# Patient Record
Sex: FEMALE | Race: WHITE
[De-identification: names, ages, dates, MRNs, and addresses within clinical notes are randomized per-mention and may not be internally consistent; named-entity substitution may affect disease eponyms.]

---

## 2019-12-13 ENCOUNTER — HOSPITAL ENCOUNTER (EMERGENCY)
Dept: HOSPITAL 97 - ER | Age: 84
Discharge: HOME | End: 2019-12-13
Payer: COMMERCIAL

## 2019-12-13 VITALS — DIASTOLIC BLOOD PRESSURE: 78 MMHG | TEMPERATURE: 97.8 F | OXYGEN SATURATION: 99 % | SYSTOLIC BLOOD PRESSURE: 128 MMHG

## 2019-12-13 DIAGNOSIS — S92.332A: ICD-10-CM
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
## 2019-12-13 DIAGNOSIS — Z88.0: ICD-10-CM

## 2019-12-13 DIAGNOSIS — S92.322A: Primary | ICD-10-CM

## 2019-12-13 DIAGNOSIS — S92.342A: ICD-10-CM
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
## 2019-12-13 DIAGNOSIS — Z88.6: ICD-10-CM

## 2019-12-13 PROCEDURE — 99284 EMERGENCY DEPT VISIT MOD MDM: CPT

## 2019-12-13 PROCEDURE — 90471 IMMUNIZATION ADMIN: CPT

## 2019-12-13 PROCEDURE — 90714 TD VACC NO PRESV 7 YRS+ IM: CPT

## 2019-12-13 PROCEDURE — 2W3RX1Z IMMOBILIZATION OF LEFT LOWER LEG USING SPLINT: ICD-10-PCS
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
## 2019-12-13 NOTE — RAD REPORT
EXAM DESCRIPTION:  RAD - Foot Left 3 View - 12/13/2019 12:57 pm

 

CLINICAL HISTORY:  Left Foot pain

 

FINDINGS:  Mildly displaced fractures involve the bases of the second, third fourth metatarsals.

 

Small bony density lies adjacent to medial cuneiform bone which may represent a small avulsion fractu
re

 

No dislocation noted.

 

Bones are osteoporotic

## 2024-11-23 ENCOUNTER — HOSPITAL ENCOUNTER (EMERGENCY)
Dept: HOSPITAL 97 - ER | Age: 89
Discharge: HOME | End: 2024-11-23
Payer: COMMERCIAL

## 2024-11-23 DIAGNOSIS — I10: ICD-10-CM

## 2024-11-23 DIAGNOSIS — E78.5: ICD-10-CM

## 2024-11-23 DIAGNOSIS — R55: Primary | ICD-10-CM

## 2024-11-23 LAB
ALBUMIN SERPL BCP-MCNC: 3.7 G/DL (ref 3.4–5)
ALBUMIN/GLOB SERPL: 1.3 {RATIO} (ref 1.1–1.8)
ALP SERPL-CCNC: 73 U/L (ref 45–117)
ALT SERPL W P-5'-P-CCNC: 15 U/L (ref 13–56)
ANION GAP SERPL CALC-SCNC: 8.6 MEQ/L (ref 5–15)
AST SERPL W P-5'-P-CCNC: 15 U/L (ref 15–37)
BILIRUB INDIRECT SERPL-MCNC: 0.6 MG/DL (ref 0.2–0.8)
BUN BLD-MCNC: 23 MG/DL (ref 7–18)
GLOBULIN SER CALC-MCNC: 2.8 G/DL (ref 2.3–3.5)
GLUCOSE SERPLBLD-MCNC: 130 MG/DL (ref 74–106)
HCT VFR BLD CALC: 38.1 % (ref 36–45)
HGB BLD-MCNC: 13.1 G/DL (ref 12–15)
LYMPHOCYTES # SPEC AUTO: 1.5 K/UL (ref 0.7–4.9)
MCH RBC QN AUTO: 32.7 PG (ref 27–35)
MCHC RBC AUTO-ENTMCNC: 34.4 G/DL (ref 32–36)
MCV RBC: 95 FL (ref 80–100)
NRBC # BLD: 0 10*3/UL (ref 0–0)
NRBC BLD AUTO-RTO: 0 % (ref 0–0)
PMV BLD: 7.6 FL (ref 7.6–11.3)
POTASSIUM SERPL-SCNC: 3.6 MEQ/L (ref 3.5–5.1)
RBC # BLD: 4.01 M/UL (ref 3.86–4.86)
SQUAMOUS URNS QL MICRO: <5 /HPF
TROPONIN I SERPL HS-MCNC: 11.4 PG/ML (ref ?–58.9)
UA COMPLETE W REFLEX CULTURE PNL UR: (no result)
UA COMPLETE W REFLEX CULTURE PNL UR: (no result)
WBC # BLD AUTO: 9.2 THOU/UL (ref 4.3–10.9)

## 2024-11-23 PROCEDURE — 70450 CT HEAD/BRAIN W/O DYE: CPT

## 2024-11-23 PROCEDURE — 84484 ASSAY OF TROPONIN QUANT: CPT

## 2024-11-23 PROCEDURE — 93005 ELECTROCARDIOGRAM TRACING: CPT

## 2024-11-23 PROCEDURE — 85025 COMPLETE CBC W/AUTO DIFF WBC: CPT

## 2024-11-23 PROCEDURE — 80048 BASIC METABOLIC PNL TOTAL CA: CPT

## 2024-11-23 PROCEDURE — 80076 HEPATIC FUNCTION PANEL: CPT

## 2024-11-23 PROCEDURE — 81001 URINALYSIS AUTO W/SCOPE: CPT

## 2024-11-23 PROCEDURE — 71045 X-RAY EXAM CHEST 1 VIEW: CPT

## 2024-11-23 PROCEDURE — 36415 COLL VENOUS BLD VENIPUNCTURE: CPT

## 2024-11-24 VITALS — OXYGEN SATURATION: 98 % | SYSTOLIC BLOOD PRESSURE: 148 MMHG | DIASTOLIC BLOOD PRESSURE: 67 MMHG

## 2024-11-24 VITALS — TEMPERATURE: 97.6 F

## 2024-12-30 ENCOUNTER — HOSPITAL ENCOUNTER (EMERGENCY)
Dept: HOSPITAL 97 - ER | Age: 89
Discharge: HOME | End: 2024-12-30
Payer: COMMERCIAL

## 2024-12-30 DIAGNOSIS — S05.12XA: Primary | ICD-10-CM

## 2024-12-30 DIAGNOSIS — N39.0: ICD-10-CM

## 2024-12-30 DIAGNOSIS — M10.9: ICD-10-CM

## 2024-12-30 DIAGNOSIS — Z91.81: ICD-10-CM
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
## 2024-12-30 DIAGNOSIS — S05.11XA: ICD-10-CM

## 2024-12-30 DIAGNOSIS — S00.81XA: ICD-10-CM

## 2024-12-30 DIAGNOSIS — Z88.5: ICD-10-CM

## 2024-12-30 DIAGNOSIS — I10: ICD-10-CM
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
## 2024-12-30 DIAGNOSIS — F03.90: ICD-10-CM

## 2024-12-30 DIAGNOSIS — Z88.0: ICD-10-CM
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
## 2024-12-30 DIAGNOSIS — S22.080A: ICD-10-CM

## 2024-12-30 DIAGNOSIS — E78.5: ICD-10-CM

## 2024-12-30 LAB
ANION GAP SERPL CALC-SCNC: 8.4 MEQ/L (ref 5–15)
BUN BLD-MCNC: 31 MG/DL (ref 7–18)
GLUCOSE SERPLBLD-MCNC: 125 MG/DL (ref 74–106)
HCT VFR BLD CALC: 42.2 % (ref 36–45)
HGB BLD-MCNC: 13.8 G/DL (ref 12–15)
INR BLD: 0.98
LYMPHOCYTES # SPEC AUTO: 1.7 K/UL (ref 0.7–4.9)
MCH RBC QN AUTO: 31.5 PG (ref 27–35)
MCHC RBC AUTO-ENTMCNC: 32.8 G/DL (ref 32–36)
MCV RBC: 95.9 FL (ref 80–100)
NRBC # BLD: 0 10*3/UL (ref 0–0)
NRBC BLD AUTO-RTO: 0 % (ref 0–0)
NT-PROBNP SERPL-MCNC: 526 PG/ML (ref ?–450)
PMV BLD: 8.2 FL (ref 7.6–11.3)
POTASSIUM SERPL-SCNC: 3.4 MEQ/L (ref 3.5–5.1)
PROTHROMBIN TIME: 11 SECONDS (ref 9.4–12.5)
RBC # BLD: 4.4 M/UL (ref 3.86–4.86)
SQUAMOUS URNS QL MICRO: <5 /HPF
TROPONIN I SERPL HS-MCNC: 11.3 PG/ML (ref ?–58.9)
UA COMPLETE W REFLEX CULTURE PNL UR: (no result)
UA COMPLETE W REFLEX CULTURE PNL UR: (no result)
WBC # BLD AUTO: 12.8 THOU/UL (ref 4.3–10.9)

## 2024-12-30 PROCEDURE — 85025 COMPLETE CBC W/AUTO DIFF WBC: CPT

## 2024-12-30 PROCEDURE — 96361 HYDRATE IV INFUSION ADD-ON: CPT

## 2024-12-30 PROCEDURE — 71045 X-RAY EXAM CHEST 1 VIEW: CPT

## 2024-12-30 PROCEDURE — 36415 COLL VENOUS BLD VENIPUNCTURE: CPT

## 2024-12-30 PROCEDURE — 87088 URINE BACTERIA CULTURE: CPT

## 2024-12-30 PROCEDURE — 80048 BASIC METABOLIC PNL TOTAL CA: CPT

## 2024-12-30 PROCEDURE — 87086 URINE CULTURE/COLONY COUNT: CPT

## 2024-12-30 PROCEDURE — 99285 EMERGENCY DEPT VISIT HI MDM: CPT

## 2024-12-30 PROCEDURE — 84484 ASSAY OF TROPONIN QUANT: CPT

## 2024-12-30 PROCEDURE — 81001 URINALYSIS AUTO W/SCOPE: CPT

## 2024-12-30 PROCEDURE — 72125 CT NECK SPINE W/O DYE: CPT

## 2024-12-30 PROCEDURE — 96374 THER/PROPH/DIAG INJ IV PUSH: CPT

## 2024-12-30 PROCEDURE — 83880 ASSAY OF NATRIURETIC PEPTIDE: CPT

## 2024-12-30 PROCEDURE — 70450 CT HEAD/BRAIN W/O DYE: CPT

## 2024-12-30 PROCEDURE — 93005 ELECTROCARDIOGRAM TRACING: CPT

## 2024-12-30 PROCEDURE — 72131 CT LUMBAR SPINE W/O DYE: CPT

## 2024-12-30 PROCEDURE — 85610 PROTHROMBIN TIME: CPT

## 2024-12-30 NOTE — RAD REPORT
EXAMINATION: CT CERVICAL SPINE WITHOUT CONTRAST 



CLINICAL INDICATION: Female, 89 years old. PAIN



TECHNIQUE: Axial CT images through the cervical spine were obtained without intravenous contrast. Sag
ittal and coronal reformatted images were created from the data set. One or more of the following

dose reduction techniques were used: Automated exposure control, adjustment of the mA and/or kV accor
ding to patient size, and/or iterative reconstruction. Unless otherwise specified, incidental

findings do not require dedicated imaging follow-up. [id number].



COMPARISON: No prior exam.





FINDINGS: 

ALIGNMENT: 2 mm anterolisthesis of C4 on C5 which is likely related to degenerative changes.



BONE: Vertebral body heights are maintained. No aggressive osseous lesions.



DISCS: Moderate disc height loss at C5-6.



LEVELS: Multilevel cervical spondylosis with varying degrees of neural foraminal narrowing which is m
oderate on the left at C3-4 and on the left at C5-6. Milder neural foraminal narrowing is present

bilaterally.



SOFT TISSUE: No significant abnormalities in the soft tissue of the neck. The visualized lung apices 
are clear. Carotid artery calcifications.



IMPRESSION: 

No acute fracture or traumatic malalignment of the cervical spine.



Reported By: Ion Gallagher 

Electronically Signed:  12/30/2024 6:25 PM

## 2024-12-30 NOTE — RAD REPORT
EXAM: Chest Single View



HISTORY: PAIN



COMPARISON: 11/23/2024



FINDINGS:

LUNGS/PLEURA: The lungs are clear. No pleural effusions or pneumothorax. No pulmonary edema. Similar 
elevated right hemidiaphragm

MEDIASTINUM: The mediastinal silhouette is within normal limits.

CARDIAC: The cardiac silhouette is within normal limits.

UPPER ABDOMEN: No significant abnormality.

BONES: No acute abnormality.

LINES/TUBES/OTHER: Surgical clips overlie the right hemithorax.



IMPRESSION:

No evidence of acute cardiopulmonary disease.



Reported By: Ion Gallagher 

Electronically Signed:  12/30/2024 2:49 PM

## 2024-12-30 NOTE — RAD REPORT
EXAMINATION: CT LUMBAR SPINE WITHOUT CONTRAST



CLINICAL INDICATION: Female, 89 years old. PAIN



TECHNIQUE: Axial CT images were obtained through the lumbar spine in soft tissue and bone windows wit
hout intravenous contrast. Coronal and Sagittal reformatted images were created from the data set.

One or more of the following dose reduction techniques were used: Automated exposure control, adjustm
ent of the mA and/ or kV according to patient size, and/or iterative reconstruction. Unless

otherwise specified, incidental findings do not require dedicated imaging follow-up. [id number]. 



COMPARISON: No prior exam.



FINDINGS:

For purposes of this dictation, it is assumed that there are 5 non rib-bearing lumbar type vertebrae,
 and the most caudal fully segmented lumbar vertebra is labeled L5.



ALIGNMENT: Grade 1 anterolisthesis of L5 on S1.



BONES: Inferior endplate deformity at L1 with approximately 30% loss of height inferiorly.



DISCS: Moderate to severe disc height loss is present L2-3, L4-5, and L5-S1. Mild disc height loss at
 L1-2 and L3-4.



LEVELS: Multilevel degenerative changes with varying degrees of neural foraminal narrowing. This is s
evere on the left at L4-5 and L5-S1 and on the right is moderate to severe at L1-2 and L2-3. There

is at least moderate central spinal stenosis at L2-3. Mild central spinal stenosis present L1-2. No h
igh-grade central spinal stenosis.



SOFT TISSUE: Bilateral renal lesions of varying complexity. Is incompletely evaluated without IV cont
rast. Aortic atherosclerosis.



IMPRESSION:

Compression fracture at T12 involving the inferior endplate with up to 30% loss of height. This fract
ure may be subacute however correlate with site and duration of pain.



Reported By: Ion Gallagher 

Electronically Signed:  12/30/2024 6:31 PM

## 2024-12-30 NOTE — EDPHYS
Physician Documentation                                                                           

 [facility]                                                                 

Name: Estella Atwood                                                                               

Age: 89 yrs                                                                                       

Sex: Female                                                                                       

: 1935                                                                                   

MRN: [id number]                                                                                   

Arrival Date: 2024                                                                          

Time: 13:27                                                                                       

Account#: [id number]                                                                            

Bed 27                                                                                            

Private MD:                                                                                       

ED Physician Jennifer Garcia                                                                        

HPI:                                                                                              

                                                                                             

18:11 This 89 yrs old  Female presents to ER via Wheelchair with complaints of Fall  gb1 

      Injury, Low Back Pain, Decreased Appetite.                                                  

18:11 89-year-old female with history of multiple falls at home. She is history of dementia,  gb1 

      gout hyperlipidemia and hypertension. She is currently on Plavix. Patient still members     

      do live with her and states that they lost last had a fall on Saturday or prior to.         

      Patient has a history of abnormal mental status due to dementia. No fevers, chills or       

      neck pain. Patient has been having low back pain and also not eating as much recently..     

                                                                                                  

Historical:                                                                                       

- Allergies:                                                                                      

14:21 Codeine;                                                                                jl7 

14:21 Morphine;                                                                               jl7 

14:21 PENICILLINS;                                                                            jl7 

- PMHx:                                                                                           

14:21 Dementia; Gout; Hyperlipidemia; Hypertension;                                           jl7 

- PSHx:                                                                                           

14:21 hysterectomy (en);                                                                      jl7 

                                                                                                  

- Immunization history: Last tetanus immunization: unknown.                                       

- Infectious Disease History:: Denies.                                                            

- Social history:: Smoking status: Patient denies any tobacco usage or history of.                

                                                                                                  

                                                                                                  

Exam:                                                                                             

18:11 Constitutional:  This is a well developed, well nourished patient who is awake, alert,  gb1 

      and in no acute distress. Head/Face:  Patient's entire face has scattered bruising in       

      multiple stages of healing.  She has periorbital bilateral hematomas, she has supra and     

      infraorbital swelling as well.  The right temporal area on the frontal forehead is also     

      with hematoma.  She has some buccal swelling as well on the left and the right.  She        

      does have raccoon eyes as well on exam. Neck:  Trachea midline, no thyromegaly or           

      masses palpated, and no cervical lymphadenopathy.  Supple, full range of motion without     

      nuchal rigidity, or vertebral point tenderness.  No Meningismus. Chest/axilla:  Normal      

      chest wall appearance and motion.  Nontender with no deformity.  No lesions are             

      appreciated. Cardiovascular:  Regular rate and rhythm with a normal S1 and S2.  No          

      gallops, murmurs, or rubs.  Normal PMI, no JVD.  No pulse deficits. Respiratory:  Lungs     

      have equal breath sounds bilaterally, clear to auscultation and percussion.  No rales,      

      rhonchi or wheezes noted.  No increased work of breathing, no retractions or nasal          

      flaring. Abdomen/GI:  Soft, non-tender, with normal bowel sounds.  No distension or         

      tympany.  No guarding or rebound.  No evidence of tenderness throughout. Skin:  Warm,       

      dry with normal turgor.  Normal color with no rashes, no lesions, and no evidence of        

      cellulitis.                                                                                 

                                                                                                  

Vital Signs:                                                                                      

14:16  / 73; Pulse 57; Resp 17; Temp 97.4; Pulse Ox 100% ; Weight 67.59 kg; Height 5    jl7 

      ft. 3 in. ;                                                                                 

15:00  / 84; Pulse 60; Resp 22; Pulse Ox 93% on R/A;                                    me1 

16:00  / 77; Pulse 61; Resp 19; Pulse Ox 94% on R/A;                                    me1 

17:00  / 79; Pulse 62; Resp 20; Pulse Ox 96% on R/A;                                    me1 

18:00  / 76; Pulse 63; Resp 18; Pulse Ox 96% on R/A;                                    me1 

18:30  / 74; Pulse 55; Resp 17; Temp 98.5; Pulse Ox 96% ;                               me1 

14:16 Body Mass Index 26.39 (67.59 kg, 160.02 cm)                                             jl7 

                                                                                                  

Rohit Coma Score:                                                                               

14:16 Eye Response: to voice(3). Motor Response: obeys commands(6). Verbal Response:          jl7 

      oriented(5). Total: 14.                                                                     

                                                                                                  

Trauma Score (Adult):                                                                             

14:16 Eye Response: to voice(0); Verbal Response: oriented(1); Motor Response: obeys          jl7 

      commands(2); Systolic BP: > 89 mm Hg(4); Respiratory Rate: 10 to 29 per min(4); Rohit     

      Score: 14; Trauma Score: 11                                                                 

                                                                                                  

MDM:                                                                                              

14:17 Medical Screening Exam initiated                                                        gb1 

18:41 Data reviewed: vital signs, nurses notes, lab test result(s), CBC, electrolytes,        gb1 

      radiologic studies, CT scan. ED course: 89-year-old female status post multiple recent      

      falls at home with history of gout, dementia, hyperlipidemia and hypertension here with     

      a concern for change in the baseline of her mental status. Patient does have a UTI          

      today but does not appear septic. She has a mild HEBERT worse from her baseline to 1.55        

      today. I will recommend the patient be hydrated and I will prescribe ciprofloxacin for      

      treatment of UTI at home. I given her remembers explicit return precautions to which        

      are compliant with her discharge home today. Patient does not appear to have any signs      

      of acute intracranial injury despite being on Plavix and with multiple falls. I did         

      discuss with the family the importance of discussing the risk versus benefit ratio of       

      having the patient on Plavix at this time due to her increased risk of falls..              

                                                                                                  

                                                                                             

14:19 Order name: Basic Metabolic Panel; Complete Time: 15:14                                 gb1 

                                                                                             

14:19 Order name: CBC with Diff; Complete Time: 15:14                                         gb1 

                                                                                             

14:19 Order name: NT PRO-BNP; Complete Time: 15:14                                            gb1 

                                                                                             

14:19 Order name: PT-INR; Complete Time: 15:14                                                gb1 

                                                                                             

14:19 Order name: Troponin HS; Complete Time: 15:14                                           gb1 

                                                                                             

14:19 Order name: UAM; Complete Time: 17:08                                                   gb1 

                                                                                             

17:08 Order name: Urine Culture                                                               EDMS

                                                                                             

14:19 Order name: XRAY Chest (1 view); Complete Time: 15:14                                   gb1 

                                                                                             

17:21 Order name: CT Head Brain wo Cont; Complete Time: 18:39                                 gb1 

                                                                                             

17:21 Order name: CT C Spine; Complete Time: 18:39                                            gb1 

                                                                                             

17:21 Order name: CT Lumbar Spine Wo Con; Complete Time: 18:39                                gb1 

                                                                                             

14:19 Order name: EKG; Complete Time: 14:19                                                   gb1 

                                                                                             

14:19 Order name: Cardiac monitoring; Complete Time: 15:27                                    gb1 

                                                                                             

14:19 Order name: EKG - Nurse/Tech; Complete Time: 15:27                                      gb1 

                                                                                             

14:19 Order name: IV Saline Lock; Complete Time: 14:41                                        gb1 

                                                                                             

14:19 Order name: Labs collected and sent; Complete Time: 14:41                               gb1 

                                                                                             

14:19 Order name: O2 Per Protocol; Complete Time: 14:41                                       gb1 

                                                                                             

14:19 Order name: O2 Sat Monitoring; Complete Time: 14:41                                     gb1 

                                                                                                  

Administered Medications:                                                                         

17:35 Drug: Rocephin IV 1 grams IV at bolus once; Given slow IV push per pharmacy             me1 

      instructions Route: IV; Rate: bolus; Site: right antecubital;                               

17:37 Follow up: Response: No adverse reaction; IV Status: Completed infusion                 me1 

17:36 Drug: NS 0.9% IV 1000 ml IV at 1 bolus Per protocol; to be given as a bolus over 60     me1 

      minutes Route: IV; Rate: 1 bolus; Site: right antecubital;                                  

18:55 Follow up: Response: No adverse reaction; IV Status: Completed infusion; IV Intake:     me1 

      1000ml                                                                                      

                                                                                                  

                                                                                                  

Disposition Summary:                                                                              

24 18:44                                                                                    

Discharge Ordered                                                                                 

 Notes:       Location: Home                                                                        
  gb1

      Problem: an acute exacerbation                                                          gb1 

      Symptoms: are unchanged                                                                 gb1 

      Condition: Fair                                                                         gb1 

      Diagnosis                                                                                   

        - Fall due to bumping against object                                                  gb1 

        - Repeated falls                                                                      gb1 

        - Contusion of eyeball and orbital tissues                                            gb1 

        - Abrasion of other part of head                                                      gb1 

        - Wedge compression fracture of T11-T12 vertebra                                      gb1 

        - UTI/ Urinary tract infection, site not specified                                    gb1 

      Followup:                                                                               gb1 

        - With: Private Physician                                                                  

        - When:                                                                                    

        - Reason: Recheck today's complaints                                                       

      Discharge Instructions:                                                                     

        - Discharge Summary Sheet                                                             gb1 

        - Spinal Compression Fracture                                                         gb1 

        - Hematoma, Easy-to-Read                                                              gb1 

        - Fall Prevention in the Home, Adult                                                  gb1 

        - Urinary Tract Infection, Adult                                                      gb1 

      Forms:                                                                                      

        - Medication Reconciliation Form                                                      gb1 

        - Antibiotic Education                                                                gb1 

        - Prescription Opioid Use                                                             gb1 

        - Patient Portal Instructions                                                         gb1 

        - Leadership Thank You Letter                                                         gb1 

      Prescriptions:                                                                              

        - Cipro 500 mg Oral Tablet                                                                 

            - take 1 tablet ORAL route every 12 hours for 10 days; 20 tablet; Refills: 0,     gb1 

      Product Selection Permitted                                                                 

Signatures:                                                                                       

Dispatcher [employer]                           Fran Black RN                        RN   jl7                                                  

Jessica Thorpe RN                  RN   me1                                                  

Jennifer Garcia MD MD   gb1                                                  

                                                                                                  

**************************************************************************************************

## 2024-12-30 NOTE — RAD REPORT
EXAMINATION: CT HEAD WITHOUT CONTRAST 



CLINICAL INDICATION: Female, 89 years old.TRAUMA



TECHNIQUE: Axial CT images from the skull base to the vertex without intravenous contrast. Coronal an
d sagittal reformatted images were created from the data set. One or more of the following dose

reduction techniques were used: Automated exposure control, adjustment of the mA and/or kV according 
to patient size, and/or iterative reconstruction. Unless otherwise specified, incidental findings

do not require dedicated imaging follow-up. [id number].



COMPARISON: 11/23/2024



FINDINGS:

INTRACRANIAL: No acute intracranial hemorrhage. No hydrocephalus. No mass effect or midline shift. Mi
ld chronic small vessel ischemic changes.Mild cerebral atrophy.

VASCULATURE: No visualized abnormalities in the arteries or dural venous sinuses.

SCALP/SKULL: Right forehead hematoma.

SINUSES: The visualized paranasal sinuses and mastoid air cells are predominantly clear.



IMPRESSION:

No acute intracranial abnormality. No skull fracture.



Reported By: Ion Gallagher 

Electronically Signed:  12/30/2024 6:21 PM

## 2024-12-30 NOTE — ER
Nurse's Notes                                                                                     

 [facility]                                                                 

Name: Estella Atwood                                                                               

Age: 89 yrs                                                                                       

Sex: Female                                                                                       

: 1935                                                                                   

MRN: [id number]                                                                                   

Arrival Date: 2024                                                                          

Time: 13:27                                                                                       

Account#: [id number]                                                                            

Bed 27                                                                                            

Private MD:                                                                                       

Diagnosis: Fall due to bumping against object;Repeated falls;Contusion of eyeball and orbital     

  tissues;Abrasion of other part of head;Wedge compression fracture of T11-T12                    

  vertebra;UTI/ Urinary tract infection, site not specified                                       

                                                                                                  

Presentation:                                                                                     

                                                                                             

14:16 Chief complaint: Patient's son or daughter states: She has fallen 3 times this week,    jl7 

      last fall was Saturday night, hit head on coffee table. Reports pt is lethargic and not     

      acting normal. A\T\O x 3 in triage but drowsy, purple bruising noted to face. Pt reports    

      back pain from previous fall a couple weeks ago. Care prior to arrival: None. Mechanism     

      of Injury: Fall from standing position. an unknown distance. Trauma event details:          

      Injury occurred in the [facility], Injury occurred: at home. Injury occurred:       

      2024.                                                                          

14:16 Acuity: DANISH 2                                                                           jl7 

14:16 Method Of Arrival: Wheelchair                                                           jl7 

14:21 Coronavirus screen: At this time, the client does not indicate any symptoms associated  jl7 

      with coronavirus-19. Ebola Screen: No symptoms or risks identified at this time.            

      Initial Sepsis Screen: Does the patient meet any 2 criteria? No. Patient's initial          

      sepsis screen is negative. Does the patient have a suspected source of infection? No.       

      Patient's initial sepsis screen is negative. Risk Assessment: Do you want to hurt           

      yourself or someone else? Patient reports no desire to harm self or others. Onset of        

      symptoms was 2024.                                                             

                                                                                                  

Trauma Activation: Not Applicable                                                                 

 Physician: ED Physician; Name: ; Notified At: ; Arrived At:                                      

 Physician: General Surgeon; Name: ; Notified At: ; Arrived At:                                   

 Physician: Radiology; Name: ; Notified At: ; Arrived At:                                         

 Physician: Respiratory; Name: ; Notified At: ; Arrived At:                                       

 Physician: Lab; Name: ; Notified At: ; Arrived At:                                               

                                                                                                  

Historical:                                                                                       

- Allergies:                                                                                      

14:21 Codeine;                                                                                jl7 

14:21 Morphine;                                                                               jl7 

14:21 PENICILLINS;                                                                            jl7 

- PMHx:                                                                                           

14:21 Dementia; Gout; Hyperlipidemia; Hypertension;                                           jl7 

- PSHx:                                                                                           

14:21 hysterectomy (en);                                                                      jl7 

                                                                                                  

- Immunization history: Last tetanus immunization: unknown.                                       

- Infectious Disease History:: Denies.                                                            

- Social history:: Smoking status: Patient denies any tobacco usage or history of.                

                                                                                                  

                                                                                                  

Screenin:16 Abuse screen: Denies threats or abuse. Denies injuries from another. Tuberculosis       jl7 

      screening: No symptoms or risk factors identified.                                          

14:30 [facility] ED Fall Risk Assessment (Adult) History of falling in the last 3 months,       me1 

      including since admission Yes- fall prone (multiple falls) (3 pts) Confusion or             

      Disorientation Yes (5 pts) Intoxicated or Sedated No (0 pts) Impaired Gait Yes (1 pt)       

      Mobility Assist Device Used Yes (1 pt) Altered Elimination No (0 pt) Score/Fall Risk        

      Level 3 or more points = High Risk Maintained a safe environment, Hourly rounding           

      (assess needs \T\ fall precautionary measures) done, Used ambulatory aids as needed         

      (educated on \T\ assisted with). Nutritional screening: No deficits noted.                  

                                                                                                  

Primary Survey:                                                                                   

14:16 NO uncontrolled hemorrhage observed. A: The client responds to verbal stimuli. Airway:  jl7 

      patent. Breathing/Chest: Spontaneous respiratory effort, equal unlabored respirations,      

      breath sounds clear bilaterally, regular pattern, symmetrical chest rise and fall.          

      Circulation: No external hemorrhage present. Regular and strong central pulse, skin         

      warm/dry/normal color. Disability Client responds to verbal stimuli.                        

      Exposure/Environment: Obvious injury(ies) are noted at this time: swelling to forehead,     

      bruising to face.                                                                           

                                                                                                  

Assessment:                                                                                       

14:16 General: Appears in no apparent distress. uncomfortable, Behavior is cooperative,       jl7 

      drowsy. Pain: Complains of pain in back. Neuro: Level of Consciousness is obeys             

      commands, drowsy. Oriented to person, place, time, situation. Derm: Bruising that is        

      dark purple, on face.                                                                       

14:30 General: Appears in no apparent distress. uncomfortable, well groomed, well developed,  me1 

      Behavior is calm, cooperative, appropriate for age, Reports She has fallen 3 times this     

      week, last fall was Saturday night, hit head on coffee table. Reports pt is lethargic       

      and not acting normal. A\T\O x 3 in triage but drowsy, purple bruising noted to face. Pt    

      reports back pain from previous fall a couple weeks ago. Pain: Complains of pain in         

      diaphragm and face and back Pain does not radiate. Pain currently is 4 out of 10 on a       

      pain scale. Quality of pain is described as aching, Pain began at variable times with       

      falls over the past week. Is continuous. Neuro: Level of Consciousness is awake, alert,     

      obeys commands, Oriented to person, place, situation, Reports dementia with                 

      intermittent confusion and states she is very forgetful. Cardiovascular: Patient's skin     

      is warm and dry. Respiratory: Airway is patent Respiratory effort is even, unlabored,       

      Respiratory pattern is regular, symmetrical. GI: No signs and/or symptoms were reported     

      involving the gastrointestinal system. : No signs and/or symptoms were reported           

      regarding the genitourinary system. EENT: No signs and/or symptoms were reported            

      regarding the EENT system. Derm: Skin is intact, is healthy with good turgor, Skin is       

      pink, warm \T\ dry. Musculoskeletal: Reports pain in diaphragm and face and back. Injury    

      Description: She has fallen 3 times this week, last fall was Saturday night, hit head       

      on coffee table. Reports pt is lethargic and not acting normal. A\T\O x 3 in triage but     

      drowsy, purple bruising noted to face. Pt reports back pain from previous fall a couple     

      weeks ago.                                                                                  

19:13 Reassessment: Discharge delayed waiting on family to come pick patient up. She is a     me1 

      fall risk and cannot go to lobby.                                                           

                                                                                                  

Vital Signs:                                                                                      

14:16  / 73; Pulse 57; Resp 17; Temp 97.4; Pulse Ox 100% ; Weight 67.59 kg; Height 5    jl7 

      ft. 3 in. ;                                                                                 

15:00  / 84; Pulse 60; Resp 22; Pulse Ox 93% on R/A;                                    me1 

16:00  / 77; Pulse 61; Resp 19; Pulse Ox 94% on R/A;                                    me1 

17:00  / 79; Pulse 62; Resp 20; Pulse Ox 96% on R/A;                                    me1 

18:00  / 76; Pulse 63; Resp 18; Pulse Ox 96% on R/A;                                    me1 

18:30  / 74; Pulse 55; Resp 17; Temp 98.5; Pulse Ox 96% ;                               me1 

14:16 Body Mass Index 26.39 (67.59 kg, 160.02 cm)                                             jl7 

                                                                                                  

[place] Coma Score:                                                                               

14:16 Eye Response: to voice(3). Motor Response: obeys commands(6). Verbal Response:          jl7 

      oriented(5). Total: 14.                                                                     

                                                                                                  

Trauma Score (Adult):                                                                             

14:16 Eye Response: to voice(0); Verbal Response: oriented(1); Motor Response: obeys          jl7 

      commands(2); Systolic BP: > 89 mm Hg(4); Respiratory Rate: 10 to 29 per min(4); Rohit     

      Score: 14; Trauma Score: 11                                                                 

                                                                                                  

ED Course:                                                                                        

13:29 Patient arrived in ED.                                                                  ra3 

13:30 Jennifer Garcia MD is Attending Physician.                                               gb1 

14:16 Patient has correct armband on for positive identification.                             jl7 

14:16 Patient maintains SpO2 saturation greater than 95% on room air.                         jl7 

14:19 Triage completed.                                                                       jl7 

14:21 Arm band placed on right wrist.                                                         jl7 

14:26 Jessica Thorpe, RN is Primary Nurse.                                                me1 

14:30 Provided Education on: POC. Patient and children verbalized understanding.. Client      me1 

      placed on continuous cardiac and pulse oximetry monitoring. NIBP monitoring applied.        

      Cardiac monitor on. Pulse ox on. NIBP on.                                                   

14:41 Basic Metabolic Panel Sent.                                                             me1 

14:41 CBC with Diff Sent.                                                                     me1 

14:41 NT PRO-BNP Sent.                                                                        me1 

14:41 PT-INR Sent.                                                                            me1 

14:41 Troponin HS Sent.                                                                       me1 

14:41 Initial lab(s) drawn, by me, sent to lab. Inserted saline lock: 22 gauge in right       me1 

      antecubital area, using aseptic technique.                                                  

14:44 XRAY Chest (1 view) In Process Unspecified.                                             EDMS

15:27 EKG done, by ED staff, reviewed by Jnenifer Garcia MD.                                     me1 

16:49 UAM Sent.                                                                               me1 

16:49 No provider procedures requiring assistance completed. Urine collected: clean catch     me1 

      specimen, cloudy, brook colored.                                                            

17:36 Urine Culture Sent.                                                                     me1 

18:17 CT Head Brain wo Cont In Process Unspecified.                                           EDMS

18:18 CT C Spine In Process Unspecified.                                                      EDMS

18:18 CT Lumbar Spine Wo Con In Process Unspecified.                                          EDMS

18:57 IV discontinued, intact, bleeding controlled, No redness/swelling at site. Pressure     me1 

      dressing applied.                                                                           

                                                                                                  

Administered Medications:                                                                         

17:35 Drug: Rocephin IV 1 grams IV at bolus once; Given slow IV push per pharmacy             me1 

      instructions Route: IV; Rate: bolus; Site: right antecubital;                               

17:37 Follow up: Response: No adverse reaction; IV Status: Completed infusion                 me1 

17:36 Drug: NS 0.9% IV 1000 ml IV at 1 bolus Per protocol; to be given as a bolus over 60     me1 

      minutes Route: IV; Rate: 1 bolus; Site: right antecubital;                                  

18:55 Follow up: Response: No adverse reaction; IV Status: Completed infusion; IV Intake:     me1 

      1000ml                                                                                      

                                                                                                  

                                                                                                  

Medication:                                                                                       

14:30 VIS not applicable for this client.                                                     me1 

                                                                                                  

Intake:                                                                                           

18:55 IV: 1000ml; Total: 1000ml.                                                              me1 

                                                                                                  

Outcome:                                                                                          

18:44 Discharge ordered by MD.                                                                gb1 

19:43 Discharged to home via wheelchair, with family,                                         me1 

19:43 Condition: stable                                                                           

19:43 Discharge instructions given to patient, family, Instructed on discharge instructions,      

      follow up and referral plans. medication usage, Demonstrated understanding of               

      instructions, follow-up care, medications, Prescriptions given X 1,                         

19:44 Patient left the ED.                                                                    me1 

                                                                                                  

Signatures:                                                                                       

Dispatcher [employer]                           EDFran Sanchez RN RN   jl7                                                  

Jessica Thorpe RN RN   me1                                                  

Jennifer Garcia MD MD   gb1                                                  

Jazz Castro                                   ra3                                                  

                                                                                                  

Corrections: (The following items were deleted from the chart)                                    

14:28 14:16 Chief complaint: Patient's son or daughter states: She has fallen 3 times this    me1 

      week, last fall was Saturday night, hit head on coffee table. Reports pt is lethargic       

      and not acting normal. A\T\O x 3 in triage but drowsy, purple bruising noted to face. Pt    

      reports back pain from previous fall a couple weeks ago. jl7                                

17:22 14:16 Chief complaint: Patient's son or daughter states: She has fallen 3 times this    me1 

      week, last fall was Saturday night, hit head on coffee table. Reports pt is lethargic       

      and not acting normal. A\T\O x 3 in triage but drowsy, purple bruising noted to face. Pt    

      reports back pain from previous fall a couple weeks ago. me1                                

                                                                                                  

**************************************************************************************************

## 2024-12-31 VITALS — DIASTOLIC BLOOD PRESSURE: 74 MMHG | TEMPERATURE: 98.5 F | SYSTOLIC BLOOD PRESSURE: 154 MMHG

## 2024-12-31 VITALS — OXYGEN SATURATION: 96 %

## 2025-01-02 NOTE — EKG
Test Date:    2024-12-30               Test Time:    15:20:33

Technician:   ME                                     

                                                     

MEASUREMENT RESULTS:                                       

Intervals:                                           

Rate:         65                                     

NM:           138                                    

QRSD:         84                                     

QT:           460                                    

QTc:          478                                    

Axis:                                                

P:            68                                     

NM:           138                                    

QRS:          -18                                    

T:            71                                     

                                                     

INTERPRETIVE STATEMENTS:                                       

                                                     

Sinus rhythm with premature atrial complexes with aberrant conduction

ST & T wave abnormality, consider anterior ischemia

Prolonged QT

Abnormal ECG

Compared to ECG 11/23/2024 19:21:04

Atrial premature complex(es) now present

Aberrant conduction of supraventricular beat(s) now present

ST (T wave) deviation now present

Possible ischemia now present

Prolonged QT interval now present

Sinus arrhythmia no longer present

Left-axis deviation no longer present

Left bundle-branch block no longer present



Electronically Signed On 01-02-25 12:10:29 CST by Mendel Rosenberg